# Patient Record
Sex: MALE | Race: WHITE | NOT HISPANIC OR LATINO | Employment: FULL TIME | URBAN - METROPOLITAN AREA
[De-identification: names, ages, dates, MRNs, and addresses within clinical notes are randomized per-mention and may not be internally consistent; named-entity substitution may affect disease eponyms.]

---

## 2018-12-31 ENCOUNTER — APPOINTMENT (EMERGENCY)
Dept: RADIOLOGY | Facility: HOSPITAL | Age: 53
End: 2018-12-31
Payer: COMMERCIAL

## 2018-12-31 ENCOUNTER — HOSPITAL ENCOUNTER (EMERGENCY)
Facility: HOSPITAL | Age: 53
Discharge: HOME | End: 2018-12-31
Attending: EMERGENCY MEDICINE | Admitting: EMERGENCY MEDICINE
Payer: COMMERCIAL

## 2018-12-31 VITALS
BODY MASS INDEX: 26.34 KG/M2 | SYSTOLIC BLOOD PRESSURE: 95 MMHG | WEIGHT: 184 LBS | OXYGEN SATURATION: 98 % | TEMPERATURE: 98.4 F | HEART RATE: 108 BPM | HEIGHT: 70 IN | DIASTOLIC BLOOD PRESSURE: 61 MMHG | RESPIRATION RATE: 12 BRPM

## 2018-12-31 DIAGNOSIS — R00.0 SINUS TACHYCARDIA: ICD-10-CM

## 2018-12-31 DIAGNOSIS — I49.1 ATRIAL PREMATURE CONTRACTIONS: ICD-10-CM

## 2018-12-31 DIAGNOSIS — R00.2 PALPITATIONS: Primary | ICD-10-CM

## 2018-12-31 LAB
ALBUMIN SERPL BCP-MCNC: 4.2 G/DL (ref 3.5–5)
ALP SERPL-CCNC: 58 U/L (ref 46–116)
ALT SERPL W P-5'-P-CCNC: 51 U/L (ref 12–78)
ANION GAP SERPL CALCULATED.3IONS-SCNC: 10 MMOL/L (ref 4–13)
APTT PPP: 27 SECONDS (ref 26–38)
AST SERPL W P-5'-P-CCNC: 23 U/L (ref 5–45)
ATRIAL RATE: 110 BPM
ATRIAL RATE: 83 BPM
BASOPHILS # BLD AUTO: 0.05 THOUSANDS/ΜL (ref 0–0.1)
BASOPHILS NFR BLD AUTO: 1 % (ref 0–1)
BILIRUB DIRECT SERPL-MCNC: 0.19 MG/DL (ref 0–0.2)
BILIRUB SERPL-MCNC: 0.9 MG/DL (ref 0.2–1)
BILIRUB UR QL STRIP: NEGATIVE
BUN SERPL-MCNC: 16 MG/DL (ref 5–25)
CALCIUM SERPL-MCNC: 9.4 MG/DL (ref 8.3–10.1)
CHLORIDE SERPL-SCNC: 104 MMOL/L (ref 100–108)
CLARITY UR: CLEAR
CO2 SERPL-SCNC: 29 MMOL/L (ref 21–32)
COLOR UR: YELLOW
CREAT SERPL-MCNC: 0.99 MG/DL (ref 0.6–1.3)
DEPRECATED D DIMER PPP: <270 NG/ML (FEU)
EOSINOPHIL # BLD AUTO: 0.22 THOUSAND/ΜL (ref 0–0.61)
EOSINOPHIL NFR BLD AUTO: 5 % (ref 0–6)
ERYTHROCYTE [DISTWIDTH] IN BLOOD BY AUTOMATED COUNT: 13.3 % (ref 11.6–15.1)
GFR SERPL CREATININE-BSD FRML MDRD: 87 ML/MIN/1.73SQ M
GLUCOSE SERPL-MCNC: 111 MG/DL (ref 65–140)
GLUCOSE UR STRIP-MCNC: NEGATIVE MG/DL
HCT VFR BLD AUTO: 47.2 % (ref 36.5–49.3)
HGB BLD-MCNC: 16.3 G/DL (ref 12–17)
HGB UR QL STRIP.AUTO: NEGATIVE
IMM GRANULOCYTES # BLD AUTO: 0.01 THOUSAND/UL (ref 0–0.2)
IMM GRANULOCYTES NFR BLD AUTO: 0 % (ref 0–2)
INR PPP: 0.96 (ref 0.86–1.17)
KETONES UR STRIP-MCNC: NEGATIVE MG/DL
LEUKOCYTE ESTERASE UR QL STRIP: NEGATIVE
LIPASE SERPL-CCNC: 231 U/L (ref 73–393)
LYMPHOCYTES # BLD AUTO: 1.5 THOUSANDS/ΜL (ref 0.6–4.47)
LYMPHOCYTES NFR BLD AUTO: 33 % (ref 14–44)
MAGNESIUM SERPL-MCNC: 2.1 MG/DL (ref 1.6–2.6)
MCH RBC QN AUTO: 30.9 PG (ref 26.8–34.3)
MCHC RBC AUTO-ENTMCNC: 34.5 G/DL (ref 31.4–37.4)
MCV RBC AUTO: 89 FL (ref 82–98)
MONOCYTES # BLD AUTO: 0.32 THOUSAND/ΜL (ref 0.17–1.22)
MONOCYTES NFR BLD AUTO: 7 % (ref 4–12)
NEUTROPHILS # BLD AUTO: 2.45 THOUSANDS/ΜL (ref 1.85–7.62)
NEUTS SEG NFR BLD AUTO: 54 % (ref 43–75)
NITRITE UR QL STRIP: NEGATIVE
NRBC BLD AUTO-RTO: 0 /100 WBCS
NT-PROBNP SERPL-MCNC: 242 PG/ML
P AXIS: 78 DEGREES
P AXIS: 88 DEGREES
PH UR STRIP.AUTO: 7.5 [PH] (ref 4.5–8)
PLATELET # BLD AUTO: 175 THOUSANDS/UL (ref 149–390)
PMV BLD AUTO: 10.3 FL (ref 8.9–12.7)
POTASSIUM SERPL-SCNC: 3.9 MMOL/L (ref 3.5–5.3)
PR INTERVAL: 164 MS
PROT SERPL-MCNC: 7.6 G/DL (ref 6.4–8.2)
PROT UR STRIP-MCNC: NEGATIVE MG/DL
PROTHROMBIN TIME: 12.7 SECONDS (ref 11.8–14.2)
QRS AXIS: 86 DEGREES
QRS AXIS: 90 DEGREES
QRSD INTERVAL: 82 MS
QRSD INTERVAL: 84 MS
QT INTERVAL: 312 MS
QT INTERVAL: 350 MS
QTC INTERVAL: 411 MS
QTC INTERVAL: 422 MS
RBC # BLD AUTO: 5.28 MILLION/UL (ref 3.88–5.62)
SODIUM SERPL-SCNC: 143 MMOL/L (ref 136–145)
SP GR UR STRIP.AUTO: 1.01 (ref 1–1.03)
T WAVE AXIS: 62 DEGREES
T WAVE AXIS: 74 DEGREES
TROPONIN I SERPL-MCNC: <0.02 NG/ML
TROPONIN I SERPL-MCNC: <0.02 NG/ML
TSH SERPL DL<=0.05 MIU/L-ACNC: 2.43 UIU/ML (ref 0.36–3.74)
UROBILINOGEN UR QL STRIP.AUTO: 0.2 E.U./DL
VENTRICULAR RATE: 110 BPM
VENTRICULAR RATE: 83 BPM
WBC # BLD AUTO: 4.55 THOUSAND/UL (ref 4.31–10.16)

## 2018-12-31 PROCEDURE — 80048 BASIC METABOLIC PNL TOTAL CA: CPT | Performed by: EMERGENCY MEDICINE

## 2018-12-31 PROCEDURE — 93005 ELECTROCARDIOGRAM TRACING: CPT

## 2018-12-31 PROCEDURE — 36415 COLL VENOUS BLD VENIPUNCTURE: CPT | Performed by: EMERGENCY MEDICINE

## 2018-12-31 PROCEDURE — 71046 X-RAY EXAM CHEST 2 VIEWS: CPT

## 2018-12-31 PROCEDURE — 83690 ASSAY OF LIPASE: CPT | Performed by: EMERGENCY MEDICINE

## 2018-12-31 PROCEDURE — 85379 FIBRIN DEGRADATION QUANT: CPT | Performed by: EMERGENCY MEDICINE

## 2018-12-31 PROCEDURE — 96361 HYDRATE IV INFUSION ADD-ON: CPT

## 2018-12-31 PROCEDURE — 85610 PROTHROMBIN TIME: CPT | Performed by: EMERGENCY MEDICINE

## 2018-12-31 PROCEDURE — 93010 ELECTROCARDIOGRAM REPORT: CPT | Performed by: INTERNAL MEDICINE

## 2018-12-31 PROCEDURE — 99244 OFF/OP CNSLTJ NEW/EST MOD 40: CPT | Performed by: INTERNAL MEDICINE

## 2018-12-31 PROCEDURE — 99284 EMERGENCY DEPT VISIT MOD MDM: CPT

## 2018-12-31 PROCEDURE — 85730 THROMBOPLASTIN TIME PARTIAL: CPT | Performed by: EMERGENCY MEDICINE

## 2018-12-31 PROCEDURE — 83880 ASSAY OF NATRIURETIC PEPTIDE: CPT | Performed by: EMERGENCY MEDICINE

## 2018-12-31 PROCEDURE — 85025 COMPLETE CBC W/AUTO DIFF WBC: CPT | Performed by: EMERGENCY MEDICINE

## 2018-12-31 PROCEDURE — 96360 HYDRATION IV INFUSION INIT: CPT

## 2018-12-31 PROCEDURE — 80076 HEPATIC FUNCTION PANEL: CPT | Performed by: EMERGENCY MEDICINE

## 2018-12-31 PROCEDURE — 84443 ASSAY THYROID STIM HORMONE: CPT | Performed by: EMERGENCY MEDICINE

## 2018-12-31 PROCEDURE — 83735 ASSAY OF MAGNESIUM: CPT | Performed by: EMERGENCY MEDICINE

## 2018-12-31 PROCEDURE — 81003 URINALYSIS AUTO W/O SCOPE: CPT | Performed by: EMERGENCY MEDICINE

## 2018-12-31 PROCEDURE — 84484 ASSAY OF TROPONIN QUANT: CPT | Performed by: EMERGENCY MEDICINE

## 2018-12-31 RX ADMIN — METOPROLOL TARTRATE 12.5 MG: 25 TABLET, FILM COATED ORAL at 13:37

## 2018-12-31 RX ADMIN — SODIUM CHLORIDE 1000 ML: 0.9 INJECTION, SOLUTION INTRAVENOUS at 10:26

## 2018-12-31 NOTE — CONSULTS
Consultation - Cardiology Team One  Daniel Jacobo 47 y o  male MRN: [id number]  Unit/Bed#: ED 15 Encounter: [id number]    Inpatient consult to Cardiology  Consult performed by: Guzman Dee ordered by: Denita Oliveros          Physician Requesting Consult: Guilherme Galicia DO  Reason for Consult / Principal Problem: palpitations    HPI: Cardiologist Dr Arnel Vick is a 47y o  year old male who has a history of HLD, stroke 14 years ago 2/2 to PFO on ASA  complains of constant chest palpitations and chest discomfort for last 2 days  States that discomfort feels like a wave a pressure that starts in my chest and goes up to my throat and sinuses"  Denies nausea, vomiting or heartburn  Exercise seems to help palpitations and nothing makes it worse  Denies personal history of CAD or arrhythmias  Denies history of hypertension, hyperlipidemia, diabetes  Positive family history of grandfather with MI  States he maintains a healthy diet and exercise regimen at home without limitations  Denies excessive coffee, energy drink or chocolate consumption  No recent sickness  Denies smoking, drinking alcohol, illicit drug use  Denies shortness of breath, headache, blurred vision, syncope, pain or swelling in the extremities       REVIEW OF SYSTEMS:  Constitutional:  Denies fever or chills   Eyes:  Denies change in visual acuity   HENT:  Denies nasal congestion or sore throat   Respiratory:  Denies cough or shortness of breath   Cardiovascular:  Denies chest pain or edema   GI:  Denies abdominal pain, nausea, vomiting, bloody stools or diarrhea   :  Denies dysuria, frequency, difficulty in micturition and nocturia  Musculoskeletal:  Denies back pain or joint pain   Neurologic:  Denies headache, focal weakness or sensory changes   Endocrine:  Denies polyuria or polydipsia   Lymphatic:  Denies swollen glands   Psychiatric:  Denies depression or anxiety     Historical Information   Past Medical History:   Diagnosis Date    GERD (gastroesophageal reflux disease)     Hernia, abdominal     Hyperlipidemia     Stroke [facility])      History reviewed  No pertinent surgical history  History   Alcohol Use No     History   Drug Use No     History   Smoking Status    Never Smoker   Smokeless Tobacco    Never Used       Family History: History reviewed  No pertinent family history  MEDS & ALLERGIES:  all current active meds have been reviewed and current meds: No current facility-administered medications for this encounter  No current facility-administered medications for this encounter  No Known Allergies    OBJECTIVE:  Vitals:   Vitals:    12/31/18 1145   BP: 114/56   Pulse: (!) 115   Resp: 22   Temp:    SpO2: 97%     Body mass index is 26 4 kg/m²  Systolic (93VWD), DHM:470 , Min:101 , JJE:908     Diastolic (33RCA), JOE:22, Min:56, Max:89    No intake or output data in the 24 hours ending 12/31/18 1312  Weight (last 2 days)     Date/Time   Weight    12/31/18 1006  83 5 (184)            Invasive Devices     Peripheral Intravenous Line            Peripheral IV 12/31/18 Right Antecubital less than 1 day                PHYSICAL EXAMS:  General:  Patient is not in acute distress, laying in the bed comfortably, awake, alert responding to commands  Head: Normocephalic, Atraumatic  HEENT: White sclera, pink conjunctiva,  PERRLA,pharynx benign  Neck:  Supple, no neck vein distention, carotids+2/+2 no bruits, thyromegaly, adenopathy  Respiratory: clear to P/A  Cardiovascular:  PMI normal, S1-S2 normal, No  Murmurs, thrills, gallops, rubs   Regular rhythm  GI:  Abdomen soft nontender   No hepatosplenomegaly, adenopathy, ascites,or rebound tenderness  Extremities: No edema, normal pulses, no calf tenderness, no joint deformities, no venous disease   Integument:  No skin rashes or ulceration  Lymphatic:  No cervical or inguinal lymphadenopathy  Neurologic:  Patient is awake alert, responding to command, well-oriented to time and place and person moving all extremities    LABORATORY RESULTS:    Results from last 7 days  Lab Units 12/31/18  1025   TROPONIN I ng/mL <0 02     CBC with diff:   Results from last 7 days  Lab Units 12/31/18  1025   WBC Thousand/uL 4 55   HEMOGLOBIN g/dL 16 3   HEMATOCRIT % 47 2   MCV fL 89   PLATELETS Thousands/uL 175   MCH pg 30 9   MCHC g/dL 34 5   RDW % 13 3   MPV fL 10 3   NRBC AUTO /100 WBCs 0       CMP:  Results from last 7 days  Lab Units 12/31/18  1025   POTASSIUM mmol/L 3 9   CHLORIDE mmol/L 104   CO2 mmol/L 29   BUN mg/dL 16   CREATININE mg/dL 0 99   CALCIUM mg/dL 9 4   AST U/L 23   ALT U/L 51   ALK PHOS U/L 58   EGFR ml/min/1 73sq m 87       BMP:  Results from last 7 days  Lab Units 12/31/18  1025   POTASSIUM mmol/L 3 9   CHLORIDE mmol/L 104   CO2 mmol/L 29   BUN mg/dL 16   CREATININE mg/dL 0 99   CALCIUM mg/dL 9 4         Results from last 7 days  Lab Units 12/31/18  1025   NT-PRO BNP pg/mL 242*        Results from last 7 days  Lab Units 12/31/18  1025   MAGNESIUM mg/dL 2 1           Results from last 7 days  Lab Units 12/31/18  1025   TSH 3RD GENERATON uIU/mL 2 431       Results from last 7 days  Lab Units 12/31/18  1025   INR  0 96       Lipid Profile:   No results found for: CHOL  No results found for: HDL  No results found for: LDLCALC  No results found for: TRIG    EKG reviewed personally:  Sinus tachycardia    Telemetry reviewed personally:   Paroxysmal atrial tachycardia    Assessment/Plan:  1- Paroxysmal atrial tachycardia  On telemetry   Troponin neg, trend x 1  Start Lopressor 12 5mg BID  Patient to schedule appointment with cardiologist in [facility]  Recommend stress test, ECHO, 24 hour holter monitor as outpatient     Code Status: No Order    Counseling / Coordination of Care  Total floor / unit time spent today 35 minutes  Greater than 50% of total time was spent with the patient and / or family counseling and / or coordination of care    A description of the counseling / coordination of care: Review of history, current assessment, development of a plan      Leonie Camacho PA-C  12/31/2018,1:12 PM

## 2018-12-31 NOTE — DISCHARGE INSTRUCTIONS
Heart Palpitations   WHAT YOU NEED TO KNOW:   Heart palpitations are feelings that your heart races, jumps, throbs, or flutters  You may feel extra beats, no beats for a short time, or skipped beats  You may have these feelings in your chest, throat, or neck  They may happen when you are sitting, standing, or lying  Heart palpitations may be frightening, but are usually not caused by a serious problem  DISCHARGE INSTRUCTIONS:   Call 911 or have someone else call for any of the following:   · You have any of the following signs of a heart attack:      ¨ Squeezing, pressure, or pain in your chest that lasts longer than 5 minutes or returns    ¨ Discomfort or pain in your back, neck, jaw, stomach, or arm     ¨ Trouble breathing    ¨ Nausea or vomiting    ¨ Lightheadedness or a sudden cold sweat, especially with chest pain or trouble breathing    · You have any of the following signs of a stroke:      ¨ Numbness or drooping on one side of your face     ¨ Weakness in an arm or leg    ¨ Confusion or difficulty speaking    ¨ Dizziness, a severe headache, or vision loss    · You faint or lose consciousness  Seek care immediately if:   · Your palpitations happen more often or last longer than usual      · You have palpitations and shortness of breath, nausea, sweating, or dizziness  Contact your healthcare provider if:   · You have questions or concerns about your condition or care  Follow up with your healthcare provider as directed: You may need to follow up with a cardiologist  Tricia Barkley may need tests to check for heart problems that cause palpitations  Write down your questions so you remember to ask them during your visits  Keep a record:  Write down when your palpitations start and stop, what you were doing when they started, and your symptoms  Keep track of what you ate or drank within a few hours of your palpitations   Include anything that seemed to help your symptoms, such as lying down or holding your breath  This record will help you and your healthcare provider learn what triggers your palpitations  Bring this record with you to your follow up visits  Help prevent heart palpitations:   · Manage stress and anxiety  Find ways to relax such as listening to music, meditating, or doing yoga  Exercise can also help decrease stress and anxiety  Talk to someone you trust about your stress or anxiety  You can also talk to a therapist      · Get plenty of sleep every night  Ask your healthcare provider how much sleep you need each night  · Do not drink caffeine or alcohol  Caffeine and alcohol can make your palpitations worse  Caffeine is found in soda, coffee, tea, chocolate, and drinks that increase your energy  · Do not smoke  Nicotine and other chemicals in cigarettes and cigars may damage your heart and blood vessels  Ask your healthcare provider for information if you currently smoke and need help to quit  E-cigarettes or smokeless tobacco still contain nicotine  Talk to your healthcare provider before you use these products  · Do not use illegal drugs  Talk to your healthcare provider if you use illegal drugs and want help to quit  © 2017 [address] [facility] Information is for End User's use only and may not be sold, redistributed or otherwise used for commercial purposes  All illustrations and images included in CareNotes® are the copyrighted property of [employer]  or [employer]ass[employer]aj[employer]  The above information is an  only  It is not intended as medical advice for individual conditions or treatments  Talk to your doctor, nurse or pharmacist before following any medical regimen to see if it is safe and effective for you  Premature Atrial Contractions   WHAT YOU NEED TO KNOW:   Premature atrial contractions (PACs) are an interruption in your heart rhythm  PACs happen when your heart gets an early signal to pump  PACs are common and usually have no cause  Most people have skipped heartbeats from time to time  Follow up with your healthcare provider so the cause of your [facility] can be diagnosed and treated  DISCHARGE INSTRUCTIONS:   Call 911 for any of the following: You have any of the following signs of a heart attack:   · Squeezing, pressure, or pain in your chest that lasts longer than 5 minutes or returns    · Discomfort or pain in your back, neck, jaw, stomach, or arm     · Trouble breathing    · Nausea or vomiting    · Lightheadedness or a sudden cold sweat, especially with chest pain or trouble breathing  Contact your healthcare provider if:   · Your symptoms do not go away, or they get worse  · You have questions or concerns about your condition or care  Medicines:   · Medicines  may be given to make your heart beat at a regular rate and rhythm  · Take your medicine as directed  Contact your healthcare provider if you think your medicine is not helping or if you have side effects  Tell him or her if you are allergic to any medicine  Keep a list of the medicines, vitamins, and herbs you take  Include the amounts, and when and why you take them  Bring the list or the pill bottles to follow-up visits  Carry your medicine list with you in case of an emergency  Follow up with your healthcare provider as directed:  Bring your Holter monitor and results with you  Write down your questions so you remember to ask them during your visits  Prevent more PACs:   · Do not have alcohol or caffeine  These can increase your PACs  · Do not smoke  Nicotine and other chemicals in cigarettes and cigars can increase heart problems  Ask your healthcare provider for information if you currently smoke and need help to quit  E-cigarettes or smokeless tobacco still contain nicotine  Talk to your healthcare provider before you use these products  · Exercise as directed  Exercise helps keep your heart healthy   Ask your healthcare provider about the best exercise plan for you  © 2017 [address] [employer]  Information is for End User's use only and may not be sold, redistributed or otherwise used for commercial purposes  All illustrations and images included in CareNotes® are the copyrighted property of [employer]  or Jass[employer] Naj[employer]  The above information is an  only  It is not intended as medical advice for individual conditions or treatments  Talk to your doctor, nurse or pharmacist before following any medical regimen to see if it is safe and effective for you

## 2018-12-31 NOTE — ED PROVIDER NOTES
History  Chief Complaint   Patient presents with    Epigastric Pain     started a day and a half ago with palpitations      Patient is a 78-year-old male with past medical history significant for hyperlipidemia, GERD, prior stroke (presented with double vision) secondary to a small PFO without residual neurologic deficits, presents to the emergency department complaining of palpitations for the past 2 days  Patient reports yesterday the palpitations were constant and he feels as though his heart is beating faster than normal and he is skipping beats  He reports several months ago having similar symptoms after he had a barium swallow procedure however it only lasted 1 day and then went away  He does report some epigastric discomfort that radiates up into his chest and throat that he describes as a gassy type feeling  He also reports associated diaphoresis last night  He denies any chest pressure or significant chest pain, dyspnea, recent fever, chills, headache, dizziness or near syncope, visual disturbance or eye pain currently, URI symptoms, cough or hemoptysis, lower abdominal pain, abdominal distension, nausea, vomiting, diarrhea, constipation, blood per rectum or melena, dysuria, change in urinary frequency, hematuria, flank pain, skin rash or color change, extremity weakness or paresthesia or other focal neurologic deficits  He reports that his prior stroke happened after a flight and now when he goes on flight she takes Lovenox  He most recently traveled to [place] and September but otherwise no other recent travel or prolonged immobilization  No prior history of DVT or PE          History provided by:  Patient   used: No    Epigastric Pain   Associated symptoms: abdominal pain, diaphoresis and palpitations    Associated symptoms: no back pain, no cough, no dizziness, no fever, no headache, no nausea, no numbness, no shortness of breath, not vomiting and no weakness        None Past Medical History:   Diagnosis Date    GERD (gastroesophageal reflux disease)     Hernia, abdominal     Hyperlipidemia     Stroke [facility])        History reviewed  No pertinent surgical history  History reviewed  No pertinent family history  I have reviewed and agree with the history as documented  Social History   Substance Use Topics    Smoking status: Never Smoker    Smokeless tobacco: Never Used    Alcohol use No        Review of Systems   Constitutional: Positive for diaphoresis  Negative for chills and fever  HENT: Negative for congestion, ear pain, hearing loss, rhinorrhea, sore throat and tinnitus  Eyes: Negative for photophobia, pain and visual disturbance  Respiratory: Negative for cough, chest tightness, shortness of breath and wheezing  Cardiovascular: Positive for palpitations  Negative for chest pain and leg swelling  Gastrointestinal: Positive for abdominal pain  Negative for blood in stool, constipation, diarrhea, nausea and vomiting  Genitourinary: Negative for dysuria, flank pain, frequency and hematuria  Musculoskeletal: Negative for back pain, neck pain and neck stiffness  Skin: Negative for color change, pallor, rash and wound  Allergic/Immunologic: Negative for immunocompromised state  Neurological: Negative for dizziness, syncope, weakness, light-headedness, numbness and headaches  Hematological: Negative for adenopathy  Psychiatric/Behavioral: Negative for confusion and decreased concentration  All other systems reviewed and are negative  Physical Exam  Physical Exam   Constitutional: He is oriented to person, place, and time  He appears well-developed and well-nourished  No distress  HENT:   Head: Normocephalic and atraumatic  Mouth/Throat: Oropharynx is clear and moist  No oropharyngeal exudate  Eyes: Pupils are equal, round, and reactive to light  Conjunctivae and EOM are normal    Neck: Normal range of motion  Neck supple   No JVD present  Cardiovascular: Regular rhythm, normal heart sounds and intact distal pulses  Exam reveals no gallop and no friction rub  No murmur heard  Patient tachycardic and is in sinus rhythm however does have PACs/skipped beats  Pulmonary/Chest: Effort normal and breath sounds normal  No respiratory distress  He has no wheezes  He has no rales  He exhibits no tenderness  Abdominal: Soft  Bowel sounds are normal  He exhibits no distension  There is no tenderness  There is no rebound and no guarding  Musculoskeletal: Normal range of motion  He exhibits no edema or tenderness  No calf swelling or tenderness  Lymphadenopathy:     He has no cervical adenopathy  Neurological: He is alert and oriented to person, place, and time  No cranial nerve deficit  No gross motor or sensory deficits  Skin: Skin is warm and dry  No rash noted  He is not diaphoretic  No pallor  Psychiatric: He has a normal mood and affect  His behavior is normal    Nursing note and vitals reviewed        Vital Signs  ED Triage Vitals [12/31/18 1006]   Temperature Pulse Respirations Blood Pressure SpO2   98 4 °F (36 9 °C) (!) 108 16 101/74 98 %      Temp Source Heart Rate Source Patient Position - Orthostatic VS BP Location FiO2 (%)   Oral Monitor -- Left arm --      Pain Score       --         Vitals:    12/31/18 1100 12/31/18 1145 12/31/18 1400 12/31/18 1430   BP: 112/89 114/56 91/56 95/61   BP Location:       Pulse: (!) 125 (!) 115 (!) 114 (!) 108   Resp: 18 22 15 12   Temp:       TempSrc:       SpO2: 94% 97% 97% 98%   Weight:       Height:         Visual Acuity      ED Medications  Medications   sodium chloride 0 9 % bolus 1,000 mL (0 mL Intravenous Stopped 12/31/18 1307)   metoprolol tartrate (LOPRESSOR) tablet 12 5 mg (12 5 mg Oral Given 12/31/18 1337)       Diagnostic Studies  Results Reviewed     Procedure Component Value Units Date/Time    Troponin I [[id number]]  (Normal) Collected:  12/31/18 1307    Lab Status:  Final result Specimen:  Blood from Arm, Right Updated:  12/31/18 1333     Troponin I <0 02 ng/mL     UA w Reflex to Microscopic [[id number]] Collected:  12/31/18 1140    Lab Status:  Final result Specimen:  Urine from Urine, Other Updated:  12/31/18 1146     Color, UA Yellow     Clarity, UA Clear     Specific Gravity, UA 1 010     pH, UA 7 5     Leukocytes, UA Negative     Nitrite, UA Negative     Protein, UA Negative mg/dl      Glucose, UA Negative mg/dl      Ketones, UA Negative mg/dl      Urobilinogen, UA 0 2 E U /dl      Bilirubin, UA Negative     Blood, UA Negative    Troponin I [[id number]]  (Normal) Collected:  12/31/18 1025    Lab Status:  Final result Specimen:  Blood from Arm, Right Updated:  12/31/18 1059     Troponin I <0 02 ng/mL     Magnesium [[id number]]  (Normal) Collected:  12/31/18 1025    Lab Status:  Final result Specimen:  Blood from Arm, Right Updated:  12/31/18 1058     Magnesium 2 1 mg/dL     Lipase [[id number]]  (Normal) Collected:  12/31/18 1025    Lab Status:  Final result Specimen:  Blood from Arm, Right Updated:  12/31/18 1058     Lipase 231 u/L     Hepatic function panel [[id number]]  (Normal) Collected:  12/31/18 1025    Lab Status:  Final result Specimen:  Blood from Arm, Right Updated:  12/31/18 1058     Total Bilirubin 0 90 mg/dL      Bilirubin, Direct 0 19 mg/dL      Alkaline Phosphatase 58 U/L      AST 23 U/L      ALT 51 U/L      Total Protein 7 6 g/dL      Albumin 4 2 g/dL     TSH, 3rd generation with Free T4 reflex [[id number]]  (Normal) Collected:  12/31/18 1025    Lab Status:  Final result Specimen:  Blood from Arm, Right Updated:  12/31/18 1058     TSH 3RD GENERATON 2 431 uIU/mL     Narrative:         Patients undergoing fluorescein dye angiography may retain small amounts of fluorescein in the body for 48-72 hours post procedure  Samples containing fluorescein can produce falsely depressed TSH values   If the patient had this procedure,a specimen should be resubmitted post fluorescein clearance  B-type natriuretic peptide [[id number]]  (Abnormal) Collected:  12/31/18 1025    Lab Status:  Final result Specimen:  Blood from Arm, Right Updated:  12/31/18 1058     NT-proBNP 242 (H) pg/mL     Basic metabolic panel [[id number]] Collected:  12/31/18 1025    Lab Status:  Final result Specimen:  Blood from Arm, Right Updated:  12/31/18 1050     Sodium 143 mmol/L      Potassium 3 9 mmol/L      Chloride 104 mmol/L      CO2 29 mmol/L      ANION GAP 10 mmol/L      BUN 16 mg/dL      Creatinine 0 99 mg/dL      Glucose 111 mg/dL      Calcium 9 4 mg/dL      eGFR 87 ml/min/1 73sq m     Narrative:         National Kidney Disease Education Program recommendations are as follows:  GFR calculation is accurate only with a steady state creatinine  Chronic Kidney disease less than 60 ml/min/1 73 sq  meters  Kidney failure less than 15 ml/min/1 73 sq  meters      D-Dimer [[id number]]  (Normal) Collected:  12/31/18 1025    Lab Status:  Final result Specimen:  Blood from Arm, Right Updated:  12/31/18 1048     D-Dimer, Quant <270 ng/ml (FEU)     Protime-INR [[id number]]  (Normal) Collected:  12/31/18 1025    Lab Status:  Final result Specimen:  Blood from Arm, Right Updated:  12/31/18 1045     Protime 12 7 seconds      INR 0 96    APTT [[id number]]  (Normal) Collected:  12/31/18 1025    Lab Status:  Final result Specimen:  Blood from Arm, Right Updated:  12/31/18 1045     PTT 27 seconds     CBC and differential [[id number]] Collected:  12/31/18 1025    Lab Status:  Final result Specimen:  Blood from Arm, Right Updated:  12/31/18 1031     WBC 4 55 Thousand/uL      RBC 5 28 Million/uL      Hemoglobin 16 3 g/dL      Hematocrit 47 2 %      MCV 89 fL      MCH 30 9 pg      MCHC 34 5 g/dL      RDW 13 3 %      MPV 10 3 fL      Platelets 531 Thousands/uL      nRBC 0 /100 WBCs      Neutrophils Relative 54 %      Immat GRANS % 0 %      Lymphocytes Relative 33 %      Monocytes Relative 7 %      Eosinophils Relative 5 %      Basophils Relative 1 %      Neutrophils Absolute 2 45 Thousands/µL      Immature Grans Absolute 0 01 Thousand/uL      Lymphocytes Absolute 1 50 Thousands/µL      Monocytes Absolute 0 32 Thousand/µL      Eosinophils Absolute 0 22 Thousand/µL      Basophils Absolute 0 05 Thousands/µL                  XR chest 2 views   Final Result by Zamzam Perez MD (12/31 1148)      No acute cardiopulmonary disease  Workstation performed: [id number]                    Procedures  ECG 12 Lead Documentation  Date/Time: 12/31/2018 10:23 AM  Performed by: Nicci Rincon  Authorized by: Nicci Rincon     ECG reviewed by me, the ED Provider: yes    Patient location:  ED  Previous ECG:     Previous ECG:  Unavailable  Rate:     ECG rate:  110    ECG rate assessment: tachycardic    Rhythm:     Rhythm: sinus tachycardia    Ectopy:     Ectopy: PAC    QRS:     QRS axis:  Normal    QRS intervals:  Normal  Conduction:     Conduction: normal    ST segments:     ST segments:  Non-specific  T waves:     T waves: normal      ECG 12 Lead Documentation  Date/Time: 12/31/2018 1:57 PM  Performed by: Nicci Rincon  Authorized by: Nicci Rincon     ECG reviewed by me, the ED Provider: yes    Patient location:  ED  Rate:     ECG rate:  83    ECG rate assessment: normal    Rhythm:     Rhythm: sinus rhythm      Rhythm comment:  Marked sinus arrhythmia  Ectopy:     Ectopy: PAC    QRS:     QRS axis:  Indeterminate    QRS intervals:  Normal  Conduction:     Conduction: normal    ST segments:     ST segments:  Normal  T waves:     T waves: normal             Phone Contacts  ED Phone Contact    ED Course  ED Course as of Dec 31 1458   Mon Dec 31, 2018   1132 Updated patient about essentially normal workup thus far  Will obtain chest x-ray given that D-dimer is normal   He still is having the palpitations and heart rate still tachycardic  Cardiology paged for further recommendations      [address] [place] [address] with cardiologist, Dr Philip Camacho, who agreed to evaluate patient  [phone] with Cardiology after he evaluated the rhythm strips and patient's telemetry and he feels he is throwing a lot of PACs and has atrial tachycardia  Given that patient is symptomatic, he recommended starting patient on metoprolol tartrate 12 5 mg b i d  He also recommend sending him home with a script for 24 hr Holter monitor and patient can have an echocardiogram as an outpatient  134 Patient's blood pressure did drop into the high 58G systolic after being given the oral metoprolol  Fluids are being hung currently and will monitor blood pressure but patient otherwise asymptomatic other than the palpitations  Patient most likely stable for discharge to follow up with his cardiologist in [place]  Will give script for metoprolol but advised him to stop taking it if he starting to feel lightheaded, dizzy or very weak  Advised him if he does feel the symptoms he should take his blood pressure or return to the ED     1440 Blood pressure improving and now 95 systolic  He is asymptomatic  Will ambulate prior to discharge  Discussed ED return parameters  MDM  Number of Diagnoses or Management Options  Diagnosis management comments: 50-year-old male presents with 2 days of palpitations and is tachycardic currently  He also reports some vague discomfort in his epigastric region  Differential includes anxiety, sinus arrhythmia, other dysrhythmia, pulmonary embolism  Will do full cardiac workup  Recommended CTA of the chest rule out PE but patient would like to avoid radiation as he recently had multiple scans and a barium swallow  He is agreeable to starting with a D-dimer however explained to patient that if the D-dimer is elevated or abnormal, then I would strongly recommend the CTA of the chest specially given his history of a PFO and prior stroke and he seems to be agreeable if that is the case  Will give IV fluid bolus    Will consider consulting Cardiology for further recommendations  Amount and/or Complexity of Data Reviewed  Clinical lab tests: ordered and reviewed  Tests in the radiology section of CPT®: ordered and reviewed  Tests in the medicine section of CPT®: ordered and reviewed  Independent visualization of images, tracings, or specimens: yes      CritCare Time    Disposition  Final diagnoses:   Palpitations   Sinus tachycardia   Atrial premature contractions     Time reflects when diagnosis was documented in both MDM as applicable and the Disposition within this note     Time User Action Codes Description Comment    12/31/2018 12:58 PM Jasper BOWIE Add [R00 2] Palpitations     12/31/2018 12:58 PM Jasper BOWIE Add [R00 0] Sinus tachycardia     12/31/2018  2:54 PM Ariadne Hernandes 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     12/31/2018  2:54 PM Ariadne Hernandes Remove [Q71 97] Monoallelic mutation of PACS1 gene     12/31/2018  2:55 PM Ariadne Hernandes Add [I49 1] Atrial premature contractions       ED Disposition     ED Disposition Condition Comment    Discharge  Daniel Donnell discharge to home/self care      Condition at discharge: Stable        Follow-up Information     Follow up With Specialties Details Why Contact Info Additional Information    Cardiologist  Schedule an appointment as soon as possible for a visit       Family doctor  Schedule an appointment as soon as possible for a visit       [address] [place] [address] [address]  Cardiology Schedule an appointment as soon as possible for a visit  [facility] [address] [facility] [address], [address] [place], [place], [address] [facility] Department Emergency Medicine Go to If symptoms worsen [address] [facility] [address] ED, [address], [place], [address] Patient's Medications   Discharge Prescriptions    METOPROLOL TARTRATE (LOPRESSOR) 25 MG TABLET    Take 0 5 tablets (12 5 mg total) by mouth every 12 (twelve) hours       Start Date: 12/31/2018End Date: --       Order Dose: 12 5 mg       Quantity: 30 tablet    Refills: 0     No discharge procedures on file      ED Provider  Electronically Signed by           Velasquez Peters DO  12/31/18 0640